# Patient Record
(demographics unavailable — no encounter records)

---

## 2024-10-30 NOTE — PHYSICAL EXAM
[de-identified] : General Exam: Appearance: well developed and nourished Orientation: Alert and oriented to person, place, time. Mood: mood and affect well-adjusted, pleasant and cooperative, appropriate for clinical and encounter circumstances  Left Knee: Skin: healed incision; intact, no rashes or lesions. Inspection: +TTP to patella with flexion; normal alignment, no warmth, no masses.   Quadriceps: Strength: 5/5, normal muscle tone.  Hamstring: Strength: 5/5, normal muscle tone.  Special tests: Normal MUSCULOSKELETAL: The patient's knee was tender at the site of the crimp from the previous surgery. The popping sensation is likely due to a band of scar tissue sliding over the piece of metal. The patient may also have bursitis, an inflammation in the area. [de-identified] : full ROM left knee [de-identified] : 2 views of the left knee show a healed patella fracture s/p ORIF, wire crimp is moderately prominent

## 2024-10-30 NOTE — DISCUSSION/SUMMARY
[de-identified] : The patient was offered the option of a subsequent surgical intervention to excise the wire and crimp if the pain becomes intractable. However, there is no urgency as the patella is stable. The patient was advised to try topical anti-inflammatory medication and continue with physical therapy. The patient was also informed that the crepitus is not clinically significant unless it is associated with pain.  The patient was given the opportunity to ask questions and all questions were answered to their satisfaction.  Nigel Pierre MD Orthopaedic Trauma Surgeon [facility] Orthopaedic  Orthopaedic Trauma, [facility]

## 2024-10-30 NOTE — REASON FOR VISIT
[Follow-Up Visit] : a follow-up visit for [FreeTextEntry2] : s/p: ORIF, left patella. Closed Treatment Left tibial plateau fracture. DOS: 07/11/2024 16

## 2024-10-30 NOTE — HISTORY OF PRESENT ILLNESS
[de-identified] : Patient follows up s/p left patella ORIF 7/11/24. She returns today with complaint of feeling a "bump" or mass to her kneecap when she bends it. Area feels tender to touch. She states this was not present to her knee at beginning of recovery. She continues to work with PT/OT.  She reports that over the past two weeks, she has been experiencing a popping sensation and pain in her knee. She has been undergoing occupational therapy (OT) and physical therapy (PT) for an unspecified condition. She noticed a new development in her knee after her first PT session at [facility]. She describes a popping sensation when moving the knee and a piece of something that can be felt. The patient also reports feeling clumsy while walking and occasionally uses a cane for support. [3] : a current pain level of 3/10 [Bending] : worsened by bending [Lifting] : worsened by lifting [Recumbency] : relieved by recumbency [Rest] : relieved by rest

## 2024-10-30 NOTE — ASSESSMENT
[FreeTextEntry1] : The patient's knee arthralgia and crepitus are likely due to the presence of the wire and crimp from the previous surgery. The crepitus is likely due to fibrous tissue sliding over the metallic implant. The patient may also have bursitis.

## 2024-12-16 NOTE — HISTORY OF PRESENT ILLNESS
[Clean/Dry/Intact] : clean, dry and intact [Erythema] : not erythematous [Discharge] : absent of discharge [Swelling] : not swollen [Dehiscence] : not dehisced [Neuro Intact] : an unremarkable neurological exam [Vascular Intact] : ~T peripheral vascular exam normal [Negative Gumaro's] : maneuvers demonstrated a negative Gumaro's sign [Doing Well] : is doing well [No Sign of Infection] : is showing no signs of infection [Adequate Pain Control] : has adequate pain control [de-identified] :  Left patellar fracture sequela.  Painful orthopedic implants, left knee.  [de-identified] : Patient is s/p removal of implants at left patella 12/2/24. She notes discomfort with bending at the knee but overall states she is feeling well. [de-identified] : 2 views of the left knee obtained today show stable appearance of patella and knee s/p hardware removal [de-identified] : Patient well appearing. She has no restrictions to weightbearing or ROM. Continue PT. Pain control PRN. Follow up 6 weeks.

## 2025-01-22 NOTE — HISTORY OF PRESENT ILLNESS
[___ Weeks Post Op] : [unfilled] weeks post op [Healed] : healed [Neuro Intact] : an unremarkable neurological exam [Vascular Intact] : ~T peripheral vascular exam normal [Negative Gumaro's] : maneuvers demonstrated a negative Gumaro's sign [Doing Well] : is doing well [Excellent Pain Control] : has excellent pain control [No Sign of Infection] : is showing no signs of infection [Erythema] : not erythematous [Discharge] : absent of discharge [Swelling] : not swollen [Dehiscence] : not dehisced [de-identified] : s/p: 1. Left patellar fracture sequela. 2. Painful orthopedic implants, left knee.  DOS: 12/02/2024 [de-identified] : Patient follows up s/p left patellar hardware removal. Patient reports she is feeling well.  [de-identified] : 2 views of the left knee obtained today show stable appearance of patella and knee s/p hardware removal. [de-identified] : Patient continues to do well post operatively. Continue PT. No restrictions. Follow up PRN.